# Patient Record
Sex: MALE | Employment: OTHER | URBAN - METROPOLITAN AREA
[De-identification: names, ages, dates, MRNs, and addresses within clinical notes are randomized per-mention and may not be internally consistent; named-entity substitution may affect disease eponyms.]

---

## 2019-10-24 ENCOUNTER — OFFICE VISIT (OUTPATIENT)
Dept: UROLOGY | Facility: CLINIC | Age: 59
End: 2019-10-24
Payer: COMMERCIAL

## 2019-10-24 DIAGNOSIS — Z90.79 S/P TURP: ICD-10-CM

## 2019-10-24 DIAGNOSIS — N48.89: Primary | ICD-10-CM

## 2019-10-24 DIAGNOSIS — N52.9 VASCULOGENIC ERECTILE DYSFUNCTION, UNSPECIFIED VASCULOGENIC ERECTILE DYSFUNCTION TYPE: ICD-10-CM

## 2019-10-24 PROCEDURE — 99213 PR OFFICE/OUTPT VISIT, EST, LEVL III, 20-29 MIN: ICD-10-PCS | Mod: S$GLB,,, | Performed by: SPECIALIST

## 2019-10-24 PROCEDURE — 99213 OFFICE O/P EST LOW 20 MIN: CPT | Mod: S$GLB,,, | Performed by: SPECIALIST

## 2019-10-24 RX ORDER — ESCITALOPRAM OXALATE 20 MG/1
TABLET ORAL
COMMUNITY

## 2019-10-24 RX ORDER — DULAGLUTIDE 1.5 MG/.5ML
INJECTION, SOLUTION SUBCUTANEOUS
Refills: 0 | COMMUNITY
Start: 2019-10-14

## 2019-10-24 RX ORDER — METHOTREXATE 2.5 MG/1
TABLET ORAL
Refills: 0 | COMMUNITY
Start: 2019-10-14

## 2019-10-24 RX ORDER — PRAVASTATIN SODIUM 40 MG/1
40 TABLET ORAL NIGHTLY
Refills: 0 | COMMUNITY
Start: 2019-10-14

## 2019-10-24 RX ORDER — CELECOXIB 200 MG/1
CAPSULE ORAL
Refills: 0 | COMMUNITY
Start: 2019-10-14

## 2019-10-24 RX ORDER — FOLIC ACID 1 MG/1
1000 TABLET ORAL DAILY
Refills: 11 | COMMUNITY
Start: 2019-08-20

## 2019-10-24 RX ORDER — BUPROPION HYDROCHLORIDE 150 MG/1
TABLET ORAL
Refills: 0 | COMMUNITY
Start: 2019-10-14

## 2019-10-24 RX ORDER — CLOTRIMAZOLE AND BETAMETHASONE DIPROPIONATE 10; .64 MG/G; MG/G
CREAM TOPICAL
Refills: 0 | COMMUNITY
Start: 2019-08-15

## 2019-10-24 RX ORDER — SILDENAFIL CITRATE 20 MG/1
20 TABLET ORAL
Qty: 90 TABLET | Refills: 11 | Status: SHIPPED | OUTPATIENT
Start: 2019-10-24 | End: 2021-04-15 | Stop reason: CLARIF

## 2019-10-25 NOTE — PROGRESS NOTES
Subjective:       Patient ID: Esvin Miles is a 59 y.o. male.    Chief Complaint: Follow-up (Pt reports dribble and feels surgery did not resolve his issues. Pt also reports penis is half the size it use to be. )      HPI:   I spent about 45 min today in the room with Mr. Esvin Miles.  He is a pleasant 59-year-old man who underwent TURP for outflow obstruction in November of 2000 and 18.  Unfortunately several days after a TURP he developed gross hematuria which required readmission for clot evacuation.  Since the removal of his Louie catheter has been voiding very well without any complaints with good stream.    Patient is reporting that he has lost his penile and sees a TURP procedure.  He maintains a he has not gained any weight.  He says he cannot even handle his penis to be able to void normally.  He feels like his penis has become uncircumcised because the penile skin is pushing forward and covering the head of his penis as a result of the weight from the suprapubic fat pad.    Patient also maintain as becoming more difficult for him to have an erection even though on him and wife acknowledged that though it may just last week.  He has retrograde ejaculation as expected after a TURP procedure.    His wife went on the Internet and said she read seven paragraphs of some of the search results which stated clearly and on a equivocally that he has penile length and penile atrophy as well as erectile dysfunction were all directly related to his TURP procedure.    Patient and wife seem very dissatisfied because the maintain that everything is experiencing right now is related to his TURP procedure.    Past Medical History:   Past Medical History:   Diagnosis Date    Anxiety and depression     Diabetes     HTN (hypertension)     Hyperlipemia        Past Surgical Historical:   Past Surgical History:   Procedure Laterality Date    TOTAL SHOULDER ARTHROPLASTY      TRANSURETHRAL RESECTION OF PROSTATE           Medications:   Medication List with Changes/Refills   Current Medications    BUPROPION (WELLBUTRIN XL) 150 MG TB24 TABLET    TAKE ONE TABLET BY MOUTH each morning FOR depression    CELECOXIB (CELEBREX) 200 MG CAPSULE    TAKE ONE CAPSULE BY MOUTH every 12 hours with food as needed FOR INFLAMATION    CLOTRIMAZOLE-BETAMETHASONE 1-0.05% (LOTRISONE) CREAM    Apply to the rash twice daily until cleared    ESCITALOPRAM OXALATE (LEXAPRO) 20 MG TABLET    escitalopram 20 mg tablet    FOLIC ACID (FOLVITE) 1 MG TABLET    Take 1,000 mcg by mouth once daily.    METHOTREXATE 2.5 MG TAB    TAKE FIVE TABLETS BY MOUTH ONCE A WEEK    PRAVASTATIN (PRAVACHOL) 40 MG TABLET    Take 40 mg by mouth nightly.    TRULICITY 1.5 MG/0.5 ML PNIJ    Inject one pen under the skin weekly    VORTIOXETINE (TRINTELLIX) 10 MG TAB    Trintellix 10 mg tablet   TAKE ONE TABLET BY MOUTH EVERY MORNING FOR depression        Past Social History:   Social History     Socioeconomic History    Marital status:      Spouse name: Not on file    Number of children: Not on file    Years of education: Not on file    Highest education level: Not on file   Occupational History    Not on file   Social Needs    Financial resource strain: Not on file    Food insecurity:     Worry: Not on file     Inability: Not on file    Transportation needs:     Medical: Not on file     Non-medical: Not on file   Tobacco Use    Smoking status: Never Smoker    Smokeless tobacco: Never Used   Substance and Sexual Activity    Alcohol use: Not Currently    Drug use: Not on file    Sexual activity: Not on file   Lifestyle    Physical activity:     Days per week: Not on file     Minutes per session: Not on file    Stress: Not on file   Relationships    Social connections:     Talks on phone: Not on file     Gets together: Not on file     Attends [employer] service: Not on file     Active member of club or organization: Not on file     Attends meetings of clubs or  organizations: Not on file     Relationship status: Not on file   Other Topics Concern    Not on file   Social History Narrative    Not on file       Allergies: Review of patient's allergies indicates:  No Known Allergies     Family History: History reviewed. No pertinent family history.     Review of Systems:   systems reviewed and notable for retention  All other systems were reviewed Neg except as stated in the HPI    Physical Exam:  AGeneral: A&Ox3. No apparent distress. No deformities.  Neck: No masses. Normal thyroid.  Lungs: normal inspiration. No use of accessory muscles.  Heart: normal pulse. No arrhythmias.  Abdomen: Soft. NT. ND. No masses. No hernias. No hepatosplenomegaly.  Lymphatic: Neck and groin nodes negative.  Skin: The skin is warm and dry. No jaundice.  Neurology: Cranial nerves 2-12 crossly intact, no focal weaknesses, no sensation deficits, no motor deficits  Ext: No clubbing, cyanosis or edema.  : External genitalia normal.  Patient has a superficial pubic fat pad does seem to be even be larger than was noted on the previous examinations he has an abdominal girth.  He is circumcised.  Most of the penile and his buried deep in the fat pad.  The scrotum looks normal.  The urethra meatus looks normal.  Digital rectal exam was deferred        Assessment/Plan:       59-year-old man who had a TURP about a year ago reporting penile length loss penile shrinkage and erectile dysfunction  1.  I am going to give him a trial of sildenafil citrate he is not taking any contraindicated medications at this point.    2.  I will obtain serum testosterone levels today in addition to serum estradiol levels.    3.  As explained to the patient during the visit there is very little correlation between penile length loss as well as erectile dysfunction and TURP procedure.  But I promise patient that I was going to restart more experience colleagues to see if this was even reportable complication of TURP  procedure.     Problem List Items Addressed This Visit        Renal/    Vasculogenic erectile dysfunction    Current Assessment & Plan     Patient with a history of diabetes and obesity report erectile dysfunction.  I am going to give him a trial of sildenafil citrate with instruction to take it.  We will also check his serum testosterone levels.         Relevant Orders    Testosterone    Penile atrophy - Primary    Current Assessment & Plan     Patient is convinced that he lost penile length since his TURP procedure almost a year ago.  He denies any weight gain.  Will get it removed for trial of PDE 5 inhibitors infrequent sexual activity.  I have also recommended at least a 25-30 lb weight loss.         S/P TURP    Current Assessment & Plan     He is voiding to completion with a very good stream he reports intermittent urgency is but he empties his bladder very well so we will continue to observe his voiding pattern.

## 2021-03-26 ENCOUNTER — OFFICE VISIT (OUTPATIENT)
Dept: UROLOGY | Facility: CLINIC | Age: 61
End: 2021-03-26
Payer: COMMERCIAL

## 2021-03-26 VITALS — SYSTOLIC BLOOD PRESSURE: 116 MMHG | HEART RATE: 74 BPM | DIASTOLIC BLOOD PRESSURE: 58 MMHG

## 2021-03-26 DIAGNOSIS — N48.89: ICD-10-CM

## 2021-03-26 DIAGNOSIS — N52.9 VASCULOGENIC ERECTILE DYSFUNCTION, UNSPECIFIED VASCULOGENIC ERECTILE DYSFUNCTION TYPE: ICD-10-CM

## 2021-03-26 DIAGNOSIS — R39.11 URINARY HESITANCY: Primary | ICD-10-CM

## 2021-03-26 DIAGNOSIS — R30.0 DYSURIA: ICD-10-CM

## 2021-03-26 PROCEDURE — 81001 PR  URINALYSIS, AUTO, W/SCOPE: ICD-10-PCS | Mod: S$GLB,,, | Performed by: SPECIALIST

## 2021-03-26 PROCEDURE — 99213 OFFICE O/P EST LOW 20 MIN: CPT | Mod: 25,S$GLB,, | Performed by: SPECIALIST

## 2021-03-26 PROCEDURE — 81001 URINALYSIS AUTO W/SCOPE: CPT | Mod: S$GLB,,, | Performed by: SPECIALIST

## 2021-03-26 PROCEDURE — 99213 PR OFFICE/OUTPT VISIT, EST, LEVL III, 20-29 MIN: ICD-10-PCS | Mod: 25,S$GLB,, | Performed by: SPECIALIST

## 2021-03-26 RX ORDER — TAMSULOSIN HYDROCHLORIDE 0.4 MG/1
CAPSULE ORAL
COMMUNITY

## 2021-03-29 ENCOUNTER — TELEPHONE (OUTPATIENT)
Dept: UROLOGY | Facility: CLINIC | Age: 61
End: 2021-03-29

## 2021-04-13 ENCOUNTER — TELEPHONE (OUTPATIENT)
Dept: UROLOGY | Facility: CLINIC | Age: 61
End: 2021-04-13

## 2021-04-15 ENCOUNTER — PROCEDURE VISIT (OUTPATIENT)
Dept: UROLOGY | Facility: CLINIC | Age: 61
End: 2021-04-15
Payer: COMMERCIAL

## 2021-04-15 VITALS
TEMPERATURE: 98 F | BODY MASS INDEX: 44.1 KG/M2 | HEIGHT: 71 IN | RESPIRATION RATE: 18 BRPM | DIASTOLIC BLOOD PRESSURE: 71 MMHG | HEART RATE: 72 BPM | OXYGEN SATURATION: 98 % | SYSTOLIC BLOOD PRESSURE: 131 MMHG | WEIGHT: 315 LBS

## 2021-04-15 DIAGNOSIS — N52.9 VASCULOGENIC ERECTILE DYSFUNCTION, UNSPECIFIED VASCULOGENIC ERECTILE DYSFUNCTION TYPE: ICD-10-CM

## 2021-04-15 DIAGNOSIS — R39.11 URINARY HESITANCY: Primary | ICD-10-CM

## 2021-04-15 PROCEDURE — 52000 CYSTOSCOPY: ICD-10-PCS | Mod: 52,S$GLB,, | Performed by: SPECIALIST

## 2021-04-15 PROCEDURE — 52000 CYSTOURETHROSCOPY: CPT | Mod: 52,S$GLB,, | Performed by: SPECIALIST

## 2021-04-15 RX ORDER — CIPROFLOXACIN 500 MG/1
500 TABLET ORAL
Status: COMPLETED | OUTPATIENT
Start: 2021-04-15 | End: 2021-04-15

## 2021-04-15 RX ORDER — TADALAFIL 20 MG/1
TABLET ORAL
Qty: 30 TABLET | Refills: 2 | Status: SHIPPED | OUTPATIENT
Start: 2021-04-15

## 2021-04-15 RX ADMIN — CIPROFLOXACIN 500 MG: 500 TABLET ORAL at 01:04

## 2021-04-16 ENCOUNTER — TELEPHONE (OUTPATIENT)
Dept: UROLOGY | Facility: CLINIC | Age: 61
End: 2021-04-16

## 2023-02-22 NOTE — ASSESSMENT & PLAN NOTE
He is voiding to completion with a very good stream he reports intermittent urgency is but he empties his bladder very well so we will continue to observe his voiding pattern.  
Patient is convinced that he lost penile length since his TURP procedure almost a year ago.  He denies any weight gain.  Will get it removed for trial of PDE 5 inhibitors infrequent sexual activity.  I have also recommended at least a 25-30 lb weight loss.  
Patient with a history of diabetes and obesity report erectile dysfunction.  I am going to give him a trial of sildenafil citrate with instruction to take it.  We will also check his serum testosterone levels.  
show

## 2023-06-27 DIAGNOSIS — N41.9 PROSTATITIS: Primary | ICD-10-CM

## 2023-08-24 ENCOUNTER — OFFICE VISIT (OUTPATIENT)
Dept: UROLOGY | Facility: CLINIC | Age: 63
End: 2023-08-24
Payer: COMMERCIAL

## 2023-08-24 VITALS
HEART RATE: 78 BPM | SYSTOLIC BLOOD PRESSURE: 118 MMHG | HEIGHT: 71 IN | WEIGHT: 305 LBS | DIASTOLIC BLOOD PRESSURE: 56 MMHG | BODY MASS INDEX: 42.7 KG/M2

## 2023-08-24 DIAGNOSIS — N40.1 BPH WITH OBSTRUCTION/LOWER URINARY TRACT SYMPTOMS: Primary | ICD-10-CM

## 2023-08-24 DIAGNOSIS — N41.9 PROSTATITIS: ICD-10-CM

## 2023-08-24 DIAGNOSIS — N13.8 BPH WITH OBSTRUCTION/LOWER URINARY TRACT SYMPTOMS: Primary | ICD-10-CM

## 2023-08-24 DIAGNOSIS — N48.9 PENIS DISORDER: ICD-10-CM

## 2023-08-24 PROCEDURE — 3078F PR MOST RECENT DIASTOLIC BLOOD PRESSURE < 80 MM HG: ICD-10-PCS | Mod: CPTII,S$GLB,, | Performed by: UROLOGY

## 2023-08-24 PROCEDURE — 1159F PR MEDICATION LIST DOCUMENTED IN MEDICAL RECORD: ICD-10-PCS | Mod: CPTII,S$GLB,, | Performed by: UROLOGY

## 2023-08-24 PROCEDURE — 3008F BODY MASS INDEX DOCD: CPT | Mod: CPTII,S$GLB,, | Performed by: UROLOGY

## 2023-08-24 PROCEDURE — 3078F DIAST BP <80 MM HG: CPT | Mod: CPTII,S$GLB,, | Performed by: UROLOGY

## 2023-08-24 PROCEDURE — 3008F PR BODY MASS INDEX (BMI) DOCUMENTED: ICD-10-PCS | Mod: CPTII,S$GLB,, | Performed by: UROLOGY

## 2023-08-24 PROCEDURE — 1159F MED LIST DOCD IN RCRD: CPT | Mod: CPTII,S$GLB,, | Performed by: UROLOGY

## 2023-08-24 PROCEDURE — 3074F PR MOST RECENT SYSTOLIC BLOOD PRESSURE < 130 MM HG: ICD-10-PCS | Mod: CPTII,S$GLB,, | Performed by: UROLOGY

## 2023-08-24 PROCEDURE — 99215 PR OFFICE/OUTPT VISIT, EST, LEVL V, 40-54 MIN: ICD-10-PCS | Mod: S$GLB,,, | Performed by: UROLOGY

## 2023-08-24 PROCEDURE — 1160F RVW MEDS BY RX/DR IN RCRD: CPT | Mod: CPTII,S$GLB,, | Performed by: UROLOGY

## 2023-08-24 PROCEDURE — 99215 OFFICE O/P EST HI 40 MIN: CPT | Mod: S$GLB,,, | Performed by: UROLOGY

## 2023-08-24 PROCEDURE — 1160F PR REVIEW ALL MEDS BY PRESCRIBER/CLIN PHARMACIST DOCUMENTED: ICD-10-PCS | Mod: CPTII,S$GLB,, | Performed by: UROLOGY

## 2023-08-24 PROCEDURE — 3074F SYST BP LT 130 MM HG: CPT | Mod: CPTII,S$GLB,, | Performed by: UROLOGY

## 2023-08-24 RX ORDER — FUROSEMIDE 20 MG/1
TABLET ORAL
COMMUNITY
Start: 2023-07-24

## 2023-08-24 RX ORDER — CERTOLIZUMAB PEGOL 200 MG/ML
INJECTION, SOLUTION SUBCUTANEOUS
COMMUNITY
Start: 2023-08-15

## 2023-08-24 RX ORDER — AZITHROMYCIN 250 MG/1
TABLET, FILM COATED ORAL
COMMUNITY
Start: 2023-08-18

## 2023-08-24 RX ORDER — HYDROXYZINE HYDROCHLORIDE 25 MG/1
TABLET, FILM COATED ORAL
COMMUNITY
Start: 2023-06-06

## 2023-08-24 RX ORDER — CYCLOBENZAPRINE HCL 10 MG
TABLET ORAL
COMMUNITY
Start: 2023-07-31

## 2023-08-24 NOTE — PROGRESS NOTES
Subjective:       Patient ID: Esvin Miles is a 63 y.o. male.    Chief Complaint: Prostatitis      HPI:  63-year-old male status post TURP by Dr. Suresh years ago patient felt that he lost penile girth and length.  He spoke to a 1E about this in the past without any resolution her satisfaction.  Patient is obese    Past Medical History:   Past Medical History:   Diagnosis Date    Anxiety and depression     Diabetes     HTN (hypertension)     Hyperlipemia        Past Surgical Historical:   Past Surgical History:   Procedure Laterality Date    TOTAL SHOULDER ARTHROPLASTY      TRANSURETHRAL RESECTION OF PROSTATE          Medications:   Medication List with Changes/Refills   Current Medications    AZITHROMYCIN (Z-RALEIGH) 250 MG TABLET        BUPROPION (WELLBUTRIN XL) 150 MG TB24 TABLET    TAKE ONE TABLET BY MOUTH each morning FOR depression    CELECOXIB (CELEBREX) 200 MG CAPSULE    TAKE ONE CAPSULE BY MOUTH every 12 hours with food as needed FOR INFLAMATION    CERTOLIZUMAB PEGOL (CIMZIA) 400 MG/2 ML (200 MG/ML X 2) SYKT        CLOTRIMAZOLE-BETAMETHASONE 1-0.05% (LOTRISONE) CREAM    Apply to the rash twice daily until cleared    CYCLOBENZAPRINE (FLEXERIL) 10 MG TABLET        ESCITALOPRAM OXALATE (LEXAPRO) 20 MG TABLET    escitalopram 20 mg tablet    FOLIC ACID (FOLVITE) 1 MG TABLET    Take 1,000 mcg by mouth once daily.    FUROSEMIDE (LASIX) 20 MG TABLET        HYDROXYZINE HCL (ATARAX) 25 MG TABLET        METHOTREXATE 2.5 MG TAB    TAKE FIVE TABLETS BY MOUTH ONCE A WEEK    PRAVASTATIN (PRAVACHOL) 40 MG TABLET    Take 40 mg by mouth nightly.    TADALAFIL (CIALIS) 20 MG TAB    Take orally 1 hour prior to sexual activity.    TAMSULOSIN (FLOMAX) 0.4 MG CAP    tamsulosin 0.4 mg capsule    TRULICITY 1.5 MG/0.5 ML PNIJ    Inject one pen under the skin weekly    VORTIOXETINE (TRINTELLIX) 10 MG TAB    Trintellix 10 mg tablet   TAKE ONE TABLET BY MOUTH EVERY MORNING FOR depression        Past Social History:   Social History      Socioeconomic History    Marital status:    Tobacco Use    Smoking status: Never    Smokeless tobacco: Never   Substance and Sexual Activity    Alcohol use: Not Currently       Allergies: Review of patient's allergies indicates:  No Known Allergies     Family History: History reviewed. No pertinent family history.     Review of Systems:  Review of Systems   Constitutional:  Negative for activity change and appetite change.   HENT:  Negative for congestion and dental problem.    Eyes:  Negative for visual disturbance.   Respiratory:  Negative for chest tightness and shortness of breath.    Cardiovascular:  Negative for chest pain.   Gastrointestinal:  Negative for abdominal distention and abdominal pain.   Endocrine: Negative for polyuria.   Genitourinary:  Negative for difficulty urinating, dysuria, flank pain, frequency, hematuria, penile discharge, penile pain, penile swelling, scrotal swelling, testicular pain and urgency.   Musculoskeletal:  Negative for back pain and neck pain.   Skin:  Negative for color change.   Allergic/Immunologic: Positive for immunocompromised state.   Neurological:  Negative for dizziness.   Hematological:  Negative for adenopathy.   Psychiatric/Behavioral:  Negative for agitation, behavioral problems and confusion.        Physical Exam:  Physical Exam  Constitutional:       General: He is not in acute distress.     Appearance: He is well-developed.   HENT:      Head: Normocephalic and atraumatic.      Nose: Nose normal.   Eyes:      General: No scleral icterus.     Conjunctiva/sclera: Conjunctivae normal.      Pupils: Pupils are equal, round, and reactive to light.   Neck:      Thyroid: No thyromegaly.      Trachea: No tracheal deviation.   Cardiovascular:      Rate and Rhythm: Normal rate and regular rhythm.      Heart sounds: Normal heart sounds.   Pulmonary:      Effort: Pulmonary effort is normal. No respiratory distress.      Breath sounds: Normal breath sounds. No  wheezing or rales.   Abdominal:      General: Bowel sounds are normal. There is no distension.      Palpations: Abdomen is soft.      Tenderness: There is no abdominal tenderness. There is no guarding or rebound.   Genitourinary:     Penis: Normal. No tenderness.       Prostate: Normal.   Musculoskeletal:         General: No deformity. Normal range of motion.      Cervical back: Neck supple.   Lymphadenopathy:      Cervical: No cervical adenopathy.   Skin:     General: Skin is warm and dry.      Findings: No erythema or rash.   Neurological:      Mental Status: He is alert and oriented to person, place, and time.      Cranial Nerves: No cranial nerve deficit.   Psychiatric:         Behavior: Behavior normal.         Assessment/Plan:       Problem List Items Addressed This Visit    None  Visit Diagnoses       BPH with obstruction/lower urinary tract symptoms    -  Primary    Prostatitis        Penis disorder                   Complaints of decreased penile length and girth after TURP:   I had a emiliano conversation with the patient stated that there is no physiologic or anatomic cause that I am able to think of that would cause his penis to shrink after a TURP I believe the patient lost some significant amount of weight it would help reveal his penis also will prescribe him sildenafil to attempt to give the patient better quality erections return to clinic as needed